# Patient Record
(demographics unavailable — no encounter records)

---

## 2024-10-27 NOTE — REVIEW OF SYSTEMS
[Itching] : Itching [Skin Rash] : skin rash [Negative] : Heme/Lymph [Chills] : no chills [Night Sweats] : no night sweats [Recent Change In Weight] : ~T no recent weight change [Chest Pain] : no chest pain [Palpitations] : no palpitations [Shortness Of Breath] : no shortness of breath [Cough] : no cough [Abdominal Pain] : no abdominal pain [Nausea] : no nausea [Constipation] : no constipation [Diarrhea] : diarrhea [Vomiting] : no vomiting [Melena] : no melena [Dysuria] : no dysuria [Hematuria] : no hematuria [Frequency] : no frequency [Mole Changes] : no mole changes [Headache] : no headache [Dizziness] : no dizziness [Fainting] : no fainting

## 2024-10-27 NOTE — HEALTH RISK ASSESSMENT
[No falls in past year] : Patient reported no falls in the past year [0] : 2) Feeling down, depressed, or hopeless: Not at all (0) [PHQ-2 Negative - No further assessment needed] : PHQ-2 Negative - No further assessment needed [Never] : Never [Patient reported mammogram was normal] : Patient reported mammogram was normal [Patient reported PAP Smear was normal] : Patient reported PAP Smear was normal [Smoke Detector] : smoke detector [Carbon Monoxide Detector] : carbon monoxide detector [Seat Belt] :  uses seat belt [Sunscreen] : uses sunscreen [[id number]] : 0 [MammogramDate] : 3/2024 [PapSmearDate] : 12/2023 [ColonoscopyDate] : 2022 [ColonoscopyComments] : due 2027

## 2024-10-27 NOTE — PHYSICAL EXAM
[No Lymphadenopathy] : no lymphadenopathy [Supple] : supple [No Edema] : there was no peripheral edema [Declined Breast Exam] : declined breast exam  [Normal] : soft, non-tender, non-distended, no masses palpated, no HSM and normal bowel sounds [Normal Posterior Cervical Nodes] : no posterior cervical lymphadenopathy [Normal Anterior Cervical Nodes] : no anterior cervical lymphadenopathy [No CVA Tenderness] : no CVA  tenderness [No Rash] : no rash [No Focal Deficits] : no focal deficits [Normal Affect] : the affect was normal [Normal Mood] : the mood was normal [Normal Insight/Judgement] : insight and judgment were intact [de-identified] : thyroid feels enlarged [de-identified] : no calf tenderness b/l LE [de-identified] :  no guarding or rigidity [de-identified] : CN 2-12 INTACT, NORMAL STRENGTH UPPER AND LOWER EXT B/L 5/5, NORMAL RAPID ALTERNATING MOVEMENTS AND FINGER TO NOSE

## 2024-10-27 NOTE — HISTORY OF PRESENT ILLNESS
[FreeTextEntry1] :  patient present for complete physical exam [de-identified] : 52yo F presents for cpe  she is feeling well

## 2024-10-27 NOTE — HISTORY OF PRESENT ILLNESS
[FreeTextEntry1] :  patient present for complete physical exam [de-identified] : 52yo F presents for cpe  she is feeling well

## 2024-10-27 NOTE — HEALTH RISK ASSESSMENT
[No falls in past year] : Patient reported no falls in the past year [0] : 2) Feeling down, depressed, or hopeless: Not at all (0) [PHQ-2 Negative - No further assessment needed] : PHQ-2 Negative - No further assessment needed [Never] : Never [Patient reported mammogram was normal] : Patient reported mammogram was normal [Patient reported PAP Smear was normal] : Patient reported PAP Smear was normal [Smoke Detector] : smoke detector [Carbon Monoxide Detector] : carbon monoxide detector [Seat Belt] :  uses seat belt [Sunscreen] : uses sunscreen [[id number]] : 0 [MammogramDate] : 3/2024 [PapSmearDate] : 12/2023 [ColonoscopyDate] : 2022 [ColonoscopyComments] : due 2027

## 2024-10-27 NOTE — PHYSICAL EXAM
[No Lymphadenopathy] : no lymphadenopathy [Supple] : supple [No Edema] : there was no peripheral edema [Declined Breast Exam] : declined breast exam  [Normal] : soft, non-tender, non-distended, no masses palpated, no HSM and normal bowel sounds [Normal Posterior Cervical Nodes] : no posterior cervical lymphadenopathy [Normal Anterior Cervical Nodes] : no anterior cervical lymphadenopathy [No CVA Tenderness] : no CVA  tenderness [No Rash] : no rash [No Focal Deficits] : no focal deficits [Normal Affect] : the affect was normal [Normal Mood] : the mood was normal [Normal Insight/Judgement] : insight and judgment were intact [de-identified] : thyroid feels enlarged [de-identified] : no calf tenderness b/l LE [de-identified] :  no guarding or rigidity [de-identified] : CN 2-12 INTACT, NORMAL STRENGTH UPPER AND LOWER EXT B/L 5/5, NORMAL RAPID ALTERNATING MOVEMENTS AND FINGER TO NOSE

## 2024-10-27 NOTE — PLAN
[FreeTextEntry1] : CPE -Labs -Mammogram up to date  -Colonoscopy Screening up to date  -Advised annual ophthalmology, dental and dermatology visits -Advised monthly breast exams -Annual depression screening - negative     thyroid nodule us thyroid check tsh  flu vaccine given left arm IM no adverse reactions noted, consent obtained  advised if patient doesnt hear from me 1 week after testing to call office for results , patient agreed w/plan and understood.